# Patient Record
Sex: MALE | Race: BLACK OR AFRICAN AMERICAN | NOT HISPANIC OR LATINO | Employment: UNEMPLOYED | URBAN - METROPOLITAN AREA
[De-identification: names, ages, dates, MRNs, and addresses within clinical notes are randomized per-mention and may not be internally consistent; named-entity substitution may affect disease eponyms.]

---

## 2024-07-15 ENCOUNTER — HOSPITAL ENCOUNTER (EMERGENCY)
Facility: HOSPITAL | Age: 69
Discharge: HOME | End: 2024-07-15
Attending: STUDENT IN AN ORGANIZED HEALTH CARE EDUCATION/TRAINING PROGRAM

## 2024-07-15 ENCOUNTER — APPOINTMENT (OUTPATIENT)
Dept: CARDIOLOGY | Facility: HOSPITAL | Age: 69
End: 2024-07-15

## 2024-07-15 VITALS
RESPIRATION RATE: 18 BRPM | HEIGHT: 69 IN | WEIGHT: 202 LBS | DIASTOLIC BLOOD PRESSURE: 85 MMHG | SYSTOLIC BLOOD PRESSURE: 152 MMHG | OXYGEN SATURATION: 94 % | BODY MASS INDEX: 29.92 KG/M2 | HEART RATE: 50 BPM | TEMPERATURE: 97.7 F

## 2024-07-15 DIAGNOSIS — M54.40 LOW BACK PAIN WITH SCIATICA, SCIATICA LATERALITY UNSPECIFIED, UNSPECIFIED BACK PAIN LATERALITY, UNSPECIFIED CHRONICITY: Primary | ICD-10-CM

## 2024-07-15 PROCEDURE — 2500000005 HC RX 250 GENERAL PHARMACY W/O HCPCS

## 2024-07-15 PROCEDURE — 2500000001 HC RX 250 WO HCPCS SELF ADMINISTERED DRUGS (ALT 637 FOR MEDICARE OP)

## 2024-07-15 PROCEDURE — 93005 ELECTROCARDIOGRAM TRACING: CPT

## 2024-07-15 PROCEDURE — 99283 EMERGENCY DEPT VISIT LOW MDM: CPT

## 2024-07-15 RX ORDER — IBUPROFEN 600 MG/1
600 TABLET ORAL ONCE
Status: COMPLETED | OUTPATIENT
Start: 2024-07-15 | End: 2024-07-15

## 2024-07-15 RX ORDER — METHOCARBAMOL 500 MG/1
500 TABLET, FILM COATED ORAL 2 TIMES DAILY
Qty: 20 TABLET | Refills: 0 | Status: SHIPPED | OUTPATIENT
Start: 2024-07-15 | End: 2024-07-25

## 2024-07-15 RX ORDER — ACETAMINOPHEN 325 MG/1
975 TABLET ORAL ONCE
Status: COMPLETED | OUTPATIENT
Start: 2024-07-15 | End: 2024-07-15

## 2024-07-15 RX ORDER — METHOCARBAMOL 500 MG/1
1000 TABLET, FILM COATED ORAL ONCE
Status: COMPLETED | OUTPATIENT
Start: 2024-07-15 | End: 2024-07-15

## 2024-07-15 RX ORDER — ACETAMINOPHEN 500 MG
1000 TABLET ORAL EVERY 6 HOURS PRN
Qty: 30 TABLET | Refills: 0 | Status: SHIPPED | OUTPATIENT
Start: 2024-07-15 | End: 2024-07-25

## 2024-07-15 RX ORDER — LIDOCAINE 560 MG/1
1 PATCH PERCUTANEOUS; TOPICAL; TRANSDERMAL DAILY
Status: DISCONTINUED | OUTPATIENT
Start: 2024-07-15 | End: 2024-07-15 | Stop reason: HOSPADM

## 2024-07-15 RX ORDER — LIDOCAINE 50 MG/G
1 PATCH TOPICAL DAILY
Qty: 7 PATCH | Refills: 0 | Status: SHIPPED | OUTPATIENT
Start: 2024-07-15

## 2024-07-15 RX ADMIN — METHOCARBAMOL 1000 MG: 500 TABLET ORAL at 09:31

## 2024-07-15 RX ADMIN — LIDOCAINE 1 PATCH: 4 PATCH TOPICAL at 09:31

## 2024-07-15 RX ADMIN — IBUPROFEN 600 MG: 600 TABLET, FILM COATED ORAL at 09:31

## 2024-07-15 RX ADMIN — ACETAMINOPHEN 975 MG: 325 TABLET ORAL at 09:31

## 2024-07-15 ASSESSMENT — PAIN DESCRIPTION - DESCRIPTORS: DESCRIPTORS: ACHING;SHARP

## 2024-07-15 ASSESSMENT — LIFESTYLE VARIABLES
EVER FELT BAD OR GUILTY ABOUT YOUR DRINKING: NO
EVER HAD A DRINK FIRST THING IN THE MORNING TO STEADY YOUR NERVES TO GET RID OF A HANGOVER: NO
TOTAL SCORE: 0
HAVE PEOPLE ANNOYED YOU BY CRITICIZING YOUR DRINKING: NO
HAVE YOU EVER FELT YOU SHOULD CUT DOWN ON YOUR DRINKING: NO

## 2024-07-15 ASSESSMENT — PAIN SCALES - GENERAL
PAINLEVEL_OUTOF10: 9
PAINLEVEL_OUTOF10: 6

## 2024-07-15 ASSESSMENT — PAIN - FUNCTIONAL ASSESSMENT: PAIN_FUNCTIONAL_ASSESSMENT: 0-10

## 2024-07-15 ASSESSMENT — PAIN DESCRIPTION - PAIN TYPE: TYPE: ACUTE PAIN

## 2024-07-15 ASSESSMENT — PAIN DESCRIPTION - LOCATION
LOCATION: BACK
LOCATION: BACK

## 2024-07-15 ASSESSMENT — PAIN DESCRIPTION - ORIENTATION
ORIENTATION: LOWER;MID
ORIENTATION: LOWER;MID

## 2024-07-15 ASSESSMENT — COLUMBIA-SUICIDE SEVERITY RATING SCALE - C-SSRS
2. HAVE YOU ACTUALLY HAD ANY THOUGHTS OF KILLING YOURSELF?: NO
6. HAVE YOU EVER DONE ANYTHING, STARTED TO DO ANYTHING, OR PREPARED TO DO ANYTHING TO END YOUR LIFE?: NO
1. IN THE PAST MONTH, HAVE YOU WISHED YOU WERE DEAD OR WISHED YOU COULD GO TO SLEEP AND NOT WAKE UP?: NO

## 2024-07-15 ASSESSMENT — PAIN DESCRIPTION - FREQUENCY: FREQUENCY: CONSTANT/CONTINUOUS

## 2024-07-15 NOTE — ED PROVIDER NOTES
HPI   Chief Complaint   Patient presents with    Back Pain       Patient is 69-year-old male with past medical history of hypertension, prediabetes presenting with concern for lower back pain.  Endorses pain started in his lumbar region, left of center after he turned in his recliner chair at group home.  Endorses he felt his back pop and has had intense pain since then.  Does report chronic pain in that region.  Per chart review, had x-rays in June that showed L5 spondylolysis.  Patient does not endorse any neurological symptoms and denies numbness, tingling in the lower extremities.  Endorses no saddle anesthesia endorses he still having regular bowel movements and urination with no difficulties.  Does not endorse any chest pain, shortness of breath, lightheadedness.  Reports quit smoking in 1999 but smoked for 15 years prior.                        [place] Coma Scale Score: 15                     Patient History   Past Medical History:   Diagnosis Date    HLD (hyperlipidemia)     Hypertension      History reviewed. No pertinent surgical history.  No family history on file.  Social History     Tobacco Use    Smoking status: Former     Types: Cigarettes    Smokeless tobacco: Never   Substance Use Topics    Alcohol use: Not Currently    Drug use: Not Currently       Physical Exam   ED Triage Vitals [07/15/24 0856]   Temperature Heart Rate Respirations BP   36.5 °C (97.7 °F) 51 18 (!) 185/96      Pulse Ox Temp Source Heart Rate Source Patient Position   95 % Tympanic Monitor Sitting      BP Location FiO2 (%)     Right arm --       Physical Exam  Constitutional:       Appearance: Normal appearance.   HENT:      Head: Normocephalic and atraumatic.   Eyes:      Extraocular Movements: Extraocular movements intact.   Cardiovascular:      Rate and Rhythm: Normal rate.   Pulmonary:      Effort: Pulmonary effort is normal.   Abdominal:      Comments: Soft, nondistended, tolerates deep palpation all 4 quadrants.  No pulsatile  mass.   Musculoskeletal:         General: Normal range of motion.      Cervical back: Normal range of motion.   Skin:     General: Skin is warm and dry.   Neurological:      General: No focal deficit present.      Mental Status: He is alert and oriented to person, place, and time.      Comments: 5/5 strength in bilateral plantarflexion/dorsiflexion, knee flexion/extension.  Hip flexion bilaterally limited by pain lower back.  Sensation intact in all dermatome distributions of lower and upper extremities.  5/5 bilateral upper extremities strength.   Psychiatric:         Mood and Affect: Mood normal.         Behavior: Behavior normal.       ED Course & MDM   Diagnoses as of 07/15/24 1053   Low back pain with sciatica, sciatica laterality unspecified, unspecified back pain laterality, unspecified chronicity       Medical Decision Making  EKG shows bradycardia 40bpm, normal sinus rhythm, normal axis,  ms,  ms, QTc 363 ms. Normal ST and T wave pattern with no evidence of acute ischemia or other acute findings.    Patient is 69-year-old male with past medical history of hypertension, prediabetes presenting with concern for lower back pain.  Description of pain most consistent with musculoskeletal cause.  CT of the lumbar spine not felt to be beneficial as patient with minor mechanism of injury, no neurological symptoms, intact neurological exam.  Has history of hypertension and remote smoking history and screened with point-of-care ultrasound.  Upper abdominal aorta 1.7 cm with narrowing to 1.1 cm at level of bifurcation, no evidence of aneurysm.  No abdominal symptoms, benign abdominal exam, no urinary symptoms and labs deferred at this time.  EKG obtained given asymptomatic bradycardia.   ms, sinus rhythm, no evidence of heart block.  Blood pressure stable and patient asymptomatic no indication for acute management.  Description of symptoms and physical exam most consistent with strain of  paravertebral muscles.  Treated symptomatically with improvement with lidocaine patch, ibuprofen, acetaminophen and methocarbamol.  Prescribed these medications for at home treatment.  Return precautions and appropriate follow-up discussed with patient and patient discharged home.    Patient seen and discussed with Dr. Maria E Hyde MD, PhD  Emergency Medicine PGY3          Procedure  Procedures     Valente Hyde MD  Resident  07/15/24 1056       Valente Hyde MD  Resident  07/15/24 1158       Valente Hyde MD  Resident  07/15/24 120

## 2024-07-15 NOTE — PROGRESS NOTES
Mr. York talked about his present health crisis and then he described how he felt. He is Religious. He requested prayer after this visit. He talked about not having his phone, shoes and his wallet. He shared that he has a son. Mr. York was offered opportunity for a holistic approach to health and wellbeing that addressed the body, mind and spirit. There are no other requests at this time. I am available upon request.  Total time: 25 min.

## 2024-07-15 NOTE — DISCHARGE INSTRUCTIONS
Your symptoms are most consistent with musculoskeletal cause of back pain such as a muscle strain.  We recommend trying the acetaminophen, ibuprofen, muscle relaxant and lidocaine patches for symptomatic management.  If symptoms consider, we would recommend following up with orthopedics for further management.    --   [facility]   [facility]  Phone: [phone]

## 2024-07-16 LAB
ATRIAL RATE: 40 BPM
P AXIS: 53 DEGREES
P OFFSET: 192 MS
P ONSET: 133 MS
PR INTERVAL: 182 MS
Q ONSET: 224 MS
QRS COUNT: 6 BEATS
QRS DURATION: 104 MS
QT INTERVAL: 446 MS
QTC CALCULATION(BAZETT): 363 MS
QTC FREDERICIA: 389 MS
R AXIS: -11 DEGREES
T AXIS: 15 DEGREES
T OFFSET: 447 MS
VENTRICULAR RATE: 40 BPM

## 2025-04-24 ENCOUNTER — APPOINTMENT (OUTPATIENT)
Dept: OPHTHALMOLOGY | Facility: CLINIC | Age: 70
End: 2025-04-24
Payer: MEDICARE

## 2025-07-18 ENCOUNTER — APPOINTMENT (OUTPATIENT)
Dept: OPHTHALMOLOGY | Facility: CLINIC | Age: 70
End: 2025-07-18
Payer: MEDICARE

## 2025-07-18 DIAGNOSIS — H26.9 CATARACT OF BOTH EYES, UNSPECIFIED CATARACT TYPE: Primary | ICD-10-CM

## 2025-07-18 PROCEDURE — 99203 OFFICE O/P NEW LOW 30 MIN: CPT | Performed by: OPHTHALMOLOGY

## 2025-07-18 RX ORDER — PREDNISOLONE SODIUM PHOSPHATE 10 MG/ML
1 SOLUTION/ DROPS OPHTHALMIC 4 TIMES DAILY
COMMUNITY

## 2025-07-18 ASSESSMENT — REFRACTION_MANIFEST
OD_CYLINDER: -1.00
OS_ADD: +2.50
OS_CYLINDER: -1.50
OS_CYLINDER: -1.50
OD_AXIS: 117
OS_AXIS: 125
OD_ADD: +2.50
OD_SPHERE: +1.00
METHOD_AUTOREFRACTION: 1
OS_AXIS: 172
OD_CYLINDER: -1.00
OD_SPHERE: +1.50
OS_SPHERE: -0.25
OD_AXIS: 120
OS_SPHERE: -0.75

## 2025-07-18 ASSESSMENT — TONOMETRY
OD_IOP_MMHG: 18
IOP_METHOD: GOLDMANN APPLANATION
OS_IOP_MMHG: 16

## 2025-07-18 ASSESSMENT — VISUAL ACUITY
OS_PH_CC+: -2
OS_BAT_MED: 20/40
OS_PH_CC: 20/40
OD_CC: 20/50
OS_CC: 20/60
OD_BAT_MED: 20/40
OD_PH_CC: 20/40
OD_CC+: -2
OS_CC+: +1
OD_PH_CC+: -1
METHOD: SNELLEN - LINEAR

## 2025-07-18 ASSESSMENT — EXTERNAL EXAM - RIGHT EYE: OD_EXAM: NORMAL

## 2025-07-18 ASSESSMENT — CONF VISUAL FIELD
OD_INFERIOR_TEMPORAL_RESTRICTION: 3
OS_INFERIOR_NASAL_RESTRICTION: 3
OS_INFERIOR_TEMPORAL_RESTRICTION: 3
OS_SUPERIOR_TEMPORAL_RESTRICTION: 3

## 2025-07-18 ASSESSMENT — SLIT LAMP EXAM - LIDS
COMMENTS: NORMAL
COMMENTS: NORMAL

## 2025-07-18 ASSESSMENT — EXTERNAL EXAM - LEFT EYE: OS_EXAM: NORMAL

## 2025-07-18 ASSESSMENT — CUP TO DISC RATIO
OD_RATIO: 0.3
OS_RATIO: 0.3

## 2025-07-18 NOTE — PROGRESS NOTES
Assessment/Plan   Diagnoses and all orders for this visit:  Cataract of both eyes, unspecified cataract type    Visually significant cataract in both eyes.     Patient understand that he will need reading glasses after surgery.    Will plan for Monofocal lens.    Plan;  See next week for lenstar (not working today), consent and scheduling for surgery.

## 2025-07-24 ENCOUNTER — APPOINTMENT (OUTPATIENT)
Dept: OPHTHALMOLOGY | Facility: CLINIC | Age: 70
End: 2025-07-24
Payer: MEDICARE

## 2025-08-15 ENCOUNTER — APPOINTMENT (OUTPATIENT)
Dept: OPHTHALMOLOGY | Facility: CLINIC | Age: 70
End: 2025-08-15
Payer: MEDICARE

## 2025-08-15 DIAGNOSIS — H26.9 CATARACT OF BOTH EYES, UNSPECIFIED CATARACT TYPE: Primary | ICD-10-CM

## 2025-08-15 PROCEDURE — 99214 OFFICE O/P EST MOD 30 MIN: CPT | Performed by: OPHTHALMOLOGY

## 2025-08-15 RX ORDER — TROPICAMIDE 10 MG/ML
1 SOLUTION/ DROPS OPHTHALMIC
OUTPATIENT
Start: 2025-08-15 | End: 2025-08-15

## 2025-08-15 RX ORDER — TETRACAINE HYDROCHLORIDE 5 MG/ML
1 SOLUTION OPHTHALMIC ONCE
OUTPATIENT
Start: 2025-08-15 | End: 2025-08-15

## 2025-08-15 RX ORDER — PHENYLEPHRINE HYDROCHLORIDE 25 MG/ML
1 SOLUTION/ DROPS OPHTHALMIC
OUTPATIENT
Start: 2025-08-15 | End: 2025-08-15

## 2025-08-15 RX ORDER — CYCLOPENTOLATE HYDROCHLORIDE 10 MG/ML
1 SOLUTION/ DROPS OPHTHALMIC
OUTPATIENT
Start: 2025-08-15 | End: 2025-08-15

## 2025-08-15 ASSESSMENT — VISUAL ACUITY
OD_SC: 20/50
OS_SC: 20/60
METHOD: SNELLEN - LINEAR

## 2025-08-15 ASSESSMENT — TONOMETRY
IOP_METHOD: GOLDMANN APPLANATION
OS_IOP_MMHG: 16
OD_IOP_MMHG: 18

## 2025-08-15 ASSESSMENT — EXTERNAL EXAM - RIGHT EYE: OD_EXAM: NORMAL

## 2025-08-15 ASSESSMENT — EXTERNAL EXAM - LEFT EYE: OS_EXAM: NORMAL

## 2025-08-15 ASSESSMENT — SLIT LAMP EXAM - LIDS
COMMENTS: NORMAL
COMMENTS: NORMAL

## 2025-08-15 ASSESSMENT — CUP TO DISC RATIO
OD_RATIO: 0.3
OS_RATIO: 0.3

## 2025-10-23 ENCOUNTER — APPOINTMENT (OUTPATIENT)
Dept: OPHTHALMOLOGY | Facility: CLINIC | Age: 70
End: 2025-10-23
Payer: MEDICARE

## 2025-10-30 ENCOUNTER — APPOINTMENT (OUTPATIENT)
Dept: OPHTHALMOLOGY | Facility: CLINIC | Age: 70
End: 2025-10-30
Payer: MEDICARE